# Patient Record
Sex: FEMALE | Race: WHITE | NOT HISPANIC OR LATINO | URBAN - METROPOLITAN AREA
[De-identification: names, ages, dates, MRNs, and addresses within clinical notes are randomized per-mention and may not be internally consistent; named-entity substitution may affect disease eponyms.]

---

## 2019-07-23 ENCOUNTER — OFFICE VISIT (OUTPATIENT)
Dept: RETAIL CLINIC | Facility: CLINIC | Age: 49
End: 2019-07-23

## 2019-07-23 VITALS
DIASTOLIC BLOOD PRESSURE: 82 MMHG | HEART RATE: 65 BPM | SYSTOLIC BLOOD PRESSURE: 124 MMHG | OXYGEN SATURATION: 99 % | TEMPERATURE: 98.4 F

## 2019-07-23 DIAGNOSIS — K04.7 TOOTH ABSCESS: Primary | ICD-10-CM

## 2019-07-23 DIAGNOSIS — K08.89 TOOTH PAIN: ICD-10-CM

## 2019-07-23 PROCEDURE — 99203 OFFICE O/P NEW LOW 30 MIN: CPT | Performed by: NURSE PRACTITIONER

## 2019-07-23 RX ORDER — AMOXICILLIN 875 MG/1
875 TABLET, COATED ORAL 2 TIMES DAILY
Qty: 20 TABLET | Refills: 0 | Status: SHIPPED | OUTPATIENT
Start: 2019-07-23 | End: 2019-08-02

## 2019-07-23 RX ORDER — CETIRIZINE HYDROCHLORIDE 10 MG/1
10 TABLET ORAL DAILY
COMMUNITY

## 2019-07-23 NOTE — PATIENT INSTRUCTIONS
Dental Abscess  A dental abscess is an area of pus in or around a tooth. It comes from an infection. It can cause pain and other symptoms. Treatment will help with symptoms and prevent the infection from spreading.  Follow these instructions at home:  Medicines  · Take over-the-counter and prescription medicines only as told by your dentist.  · If you were prescribed an antibiotic medicine, take it as told by your dentist. Do not stop taking it even if you start to feel better.  · If you were prescribed a gel that has numbing medicine in it, use it exactly as told.  · Do not drive or use heavy machinery (like a ) while taking prescription pain medicine.  General instructions  · Rinse out your mouth often with salt water.  ? To make salt water, dissolve ½-1 tsp of salt in 1 cup of warm water.  · Eat a soft diet while your mouth is healing.  · Drink enough fluid to keep your urine pale yellow.  · Do not apply heat to the outside of your mouth.  · Do not use any products that contain nicotine or tobacco. These include cigarettes and e-cigarettes. If you need help quitting, ask your doctor.  · Keep all follow-up visits as told by your dentist. This is important.  Prevent an abscess  · Brush your teeth every morning and every night. Use fluoride toothpaste.  · Floss your teeth each day.  · Get dental cleanings as often as told by your dentist.  · Think about getting dental sealant put on teeth that have deep holes (decay).  · Drink water that has fluoride in it.  ? Most tap water has fluoride.  ? Check the label on bottled water to see if it has fluoride in it.  · Drink water instead of sugary drinks.  · Eat healthy meals and snacks.  · Wear a mouth guard or face shield when you play sports.  Contact a doctor if:  · Your pain is worse, and medicine does not help.  Get help right away if:  · You have a fever or chills.  · Your symptoms suddenly get worse.  · You have a very bad headache.  · You have problems  breathing or swallowing.  · You have trouble opening your mouth.  · You have swelling in your neck or close to your eye.  Summary  · A dental abscess is an area of pus in or around a tooth. It is caused by an infection.  · Treatment will help with symptoms and prevent the infection from spreading.  · Take over-the-counter and prescription medicines only as told by your dentist.  · To prevent an abscess, take good care of your teeth. Brush your teeth every morning and night. Use floss every day.  · Get dental cleanings as often as told by your dentist.  This information is not intended to replace advice given to you by your health care provider. Make sure you discuss any questions you have with your health care provider.  Document Released: 05/03/2016 Document Revised: 08/20/2018 Document Reviewed: 08/20/2018  [employer] Interactive Patient Education © 2019 [employer]

## 2019-07-23 NOTE — PROGRESS NOTES
Subjective     Niru Reinoso is a 49 y.o.. female.     Dental Pain    This is a new problem. Episode onset: 3 days. The problem has been unchanged. The pain is mild. Pertinent negatives include no fever. She has tried NSAIDs (warm salt water/peroxide swish) for the symptoms. The treatment provided mild relief.       The following portions of the patient's history were reviewed and updated as appropriate: allergies, current medications, past medical history, past social history and past surgical history.    Review of Systems   Constitutional: Negative for fever.       Objective     Vitals:    07/23/19 1049   BP: 124/82   Pulse: 65   Temp: 98.4 °F (36.9 °C)   TempSrc: Oral   SpO2: 99%       Physical Exam   Constitutional: She is oriented to person, place, and time. She appears well-developed and well-nourished.   HENT:   Head: Normocephalic and atraumatic.   Mouth/Throat: Abnormal dentition (erytlhemic to gums around both teeth). Dental abscesses and dental caries (to both teeth) present.       Eyes: Pupils are equal, round, and reactive to light.   Cardiovascular: Normal rate and regular rhythm.   Pulmonary/Chest: Effort normal and breath sounds normal.   Musculoskeletal: Normal range of motion.   Neurological: She is alert and oriented to person, place, and time.   Vitals reviewed.      Assessment/Plan   Niru was seen today for dental pain.    Diagnoses and all orders for this visit:    Tooth abscess  -     amoxicillin (AMOXIL) 875 MG tablet; Take 1 tablet by mouth 2 (Two) Times a Day for 10 days.    Tooth pain        Patient Instructions   Dental Abscess  A dental abscess is an area of pus in or around a tooth. It comes from an infection. It can cause pain and other symptoms. Treatment will help with symptoms and prevent the infection from spreading.  Follow these instructions at home:  Medicines  · Take over-the-counter and prescription medicines only as told by your dentist.  · If you were prescribed an  antibiotic medicine, take it as told by your dentist. Do not stop taking it even if you start to feel better.  · If you were prescribed a gel that has numbing medicine in it, use it exactly as told.  · Do not drive or use heavy machinery (like a ) while taking prescription pain medicine.  General instructions  · Rinse out your mouth often with salt water.  ? To make salt water, dissolve ½-1 tsp of salt in 1 cup of warm water.  · Eat a soft diet while your mouth is healing.  · Drink enough fluid to keep your urine pale yellow.  · Do not apply heat to the outside of your mouth.  · Do not use any products that contain nicotine or tobacco. These include cigarettes and e-cigarettes. If you need help quitting, ask your doctor.  · Keep all follow-up visits as told by your dentist. This is important.  Prevent an abscess  · Brush your teeth every morning and every night. Use fluoride toothpaste.  · Floss your teeth each day.  · Get dental cleanings as often as told by your dentist.  · Think about getting dental sealant put on teeth that have deep holes (decay).  · Drink water that has fluoride in it.  ? Most tap water has fluoride.  ? Check the label on bottled water to see if it has fluoride in it.  · Drink water instead of sugary drinks.  · Eat healthy meals and snacks.  · Wear a mouth guard or face shield when you play sports.  Contact a doctor if:  · Your pain is worse, and medicine does not help.  Get help right away if:  · You have a fever or chills.  · Your symptoms suddenly get worse.  · You have a very bad headache.  · You have problems breathing or swallowing.  · You have trouble opening your mouth.  · You have swelling in your neck or close to your eye.  Summary  · A dental abscess is an area of pus in or around a tooth. It is caused by an infection.  · Treatment will help with symptoms and prevent the infection from spreading.  · Take over-the-counter and prescription medicines only as told by your  dentist.  · To prevent an abscess, take good care of your teeth. Brush your teeth every morning and night. Use floss every day.  · Get dental cleanings as often as told by your dentist.  This information is not intended to replace advice given to you by your health care provider. Make sure you discuss any questions you have with your health care provider.  Document Released: 05/03/2016 Document Revised: 08/20/2018 Document Reviewed: 08/20/2018  [employer] Interactive Patient Education © 2019 [employer]        Return if symptoms worsen or fail to improve with dentist; follow up with dentist  within one week.

## 2020-10-12 ENCOUNTER — OFFICE VISIT (OUTPATIENT)
Dept: OBSTETRICS AND GYNECOLOGY | Facility: CLINIC | Age: 50
End: 2020-10-12

## 2020-10-12 VITALS
BODY MASS INDEX: 16.55 KG/M2 | HEIGHT: 71 IN | WEIGHT: 118.2 LBS | DIASTOLIC BLOOD PRESSURE: 62 MMHG | SYSTOLIC BLOOD PRESSURE: 124 MMHG

## 2020-10-12 DIAGNOSIS — F17.210 CIGARETTE SMOKER: ICD-10-CM

## 2020-10-12 DIAGNOSIS — R87.612 LGSIL ON PAP SMEAR OF CERVIX: Primary | ICD-10-CM

## 2020-10-12 DIAGNOSIS — Z13.9 SCREENING FOR UNSPECIFIED CONDITION: ICD-10-CM

## 2020-10-12 LAB
B-HCG UR QL: NEGATIVE
INTERNAL NEGATIVE CONTROL: NEGATIVE
INTERNAL POSITIVE CONTROL: POSITIVE
Lab: NORMAL

## 2020-10-12 PROCEDURE — 57456 ENDOCERV CURETTAGE W/SCOPE: CPT | Performed by: OBSTETRICS & GYNECOLOGY

## 2020-10-12 PROCEDURE — 81025 URINE PREGNANCY TEST: CPT | Performed by: OBSTETRICS & GYNECOLOGY

## 2020-10-12 NOTE — PROGRESS NOTES
Colposcopy Procedure Note    Indications: Most recent Pap smear showed: low-grade squamous intraepithelial neoplasia (LGSIL - encompassing HPV,mild dysplasia,SOLEDAD I).  Prior cervical/vaginal disease: LGSIL/HPV.  Prior cervical treatment: no treatment.    Procedure Details   The risks and benefits of the procedure and Verbal informed consent obtained.    Speculum placed in vagina and excellent visualization of cervix achieved, cervix swabbed x 3 with acetic acid solution and Lugol's solution     Findings:  Physical Exam  Cervix: no visible lesions, no mosaicism, no punctation and no abnormal vasculature; cervix swabbed with Lugol's solution, SCJ visualized 360 degrees without lesions, endocervical curettage performed and specimen labelled and sent to pathology.  Vaginal inspection: vaginal colposcopy not performed.  Vulvar colposcopy: vulvar colposcopy not performed.    Specimens: ECC only    Complications: none.    PT TOLERATED PROCEDURE WELL.     IMPRESSION:  SMOKER, NEGATIVE, ADEQUATE COLPOSCOPY    Plan:  Call for ECC results.    Niru Reinoso  reports that she has been smoking. She has a 5.00 pack-year smoking history. She has quit using smokeless tobacco.. I have educated her on the risk of diseases from using tobacco products such as cancer, COPD and heart disease.     I advised her to quit and she is not willing to quit.    I spent 5 minutes counseling the patient.             Edgar Garcia MD  11:12 EDT  10/12/20

## 2020-10-14 LAB
DX ICD CODE: NORMAL
PATH REPORT.FINAL DX SPEC: NORMAL
PATH REPORT.GROSS SPEC: NORMAL
PATH REPORT.SITE OF ORIGIN SPEC: NORMAL
PATHOLOGIST NAME: NORMAL
PAYMENT PROCEDURE: NORMAL

## 2021-04-13 ENCOUNTER — OFFICE VISIT (OUTPATIENT)
Dept: OBSTETRICS AND GYNECOLOGY | Facility: CLINIC | Age: 51
End: 2021-04-13

## 2021-04-13 VITALS
WEIGHT: 114.4 LBS | HEIGHT: 71 IN | DIASTOLIC BLOOD PRESSURE: 70 MMHG | BODY MASS INDEX: 16.02 KG/M2 | SYSTOLIC BLOOD PRESSURE: 100 MMHG

## 2021-04-13 DIAGNOSIS — Z13.9 SCREENING FOR CONDITION: ICD-10-CM

## 2021-04-13 DIAGNOSIS — N39.0 URINARY TRACT INFECTION WITH HEMATURIA, SITE UNSPECIFIED: Primary | ICD-10-CM

## 2021-04-13 DIAGNOSIS — R87.612 LGSIL ON PAP SMEAR OF CERVIX: ICD-10-CM

## 2021-04-13 DIAGNOSIS — R31.9 URINARY TRACT INFECTION WITH HEMATURIA, SITE UNSPECIFIED: Primary | ICD-10-CM

## 2021-04-13 DIAGNOSIS — F17.210 CIGARETTE SMOKER: ICD-10-CM

## 2021-04-13 LAB
B-HCG UR QL: NEGATIVE
BILIRUB BLD-MCNC: NEGATIVE MG/DL
CLARITY, POC: CLEAR
COLOR UR: YELLOW
GLUCOSE UR STRIP-MCNC: NEGATIVE MG/DL
INTERNAL NEGATIVE CONTROL: NEGATIVE
INTERNAL POSITIVE CONTROL: POSITIVE
KETONES UR QL: NEGATIVE
LEUKOCYTE EST, POC: NEGATIVE
Lab: 55
NITRITE UR-MCNC: POSITIVE MG/ML
PH UR: 6 [PH] (ref 5–8)
PROT UR STRIP-MCNC: ABNORMAL MG/DL
RBC # UR STRIP: ABNORMAL /UL
SP GR UR: 1 (ref 1–1.03)
UROBILINOGEN UR QL: NORMAL

## 2021-04-13 PROCEDURE — 99213 OFFICE O/P EST LOW 20 MIN: CPT | Performed by: OBSTETRICS & GYNECOLOGY

## 2021-04-13 NOTE — PROGRESS NOTES
"EVALUATION AND MANAGEMENT ENCOUNTER    Niru Reinoso  Patient new to examiner? No  New problem to examiner? No  Patient referred? Yes from:  [facility].    -----------------------------------------------------HISTORY---------------------------------------------------    Chief Complaint:   Chief Complaint   Patient presents with   • Follow-up     Refered from [facility] for Abnormal PAP       HPI:  Niru Reinoso is a 51 y.o. No obstetric history on file. with No LMP recorded. (Menstrual status: Oral contraceptives). here for management of another pap that showed LGSIL.  I had recommended that this pt have a LEEP last fall, but pt did not come back for treatment.   Pt is a smoker and her mother has endometrial cancer apparently.     History of Present Illness     Niru Reinoso  reports that she has been smoking. She has a 5.00 pack-year smoking history. She has quit using smokeless tobacco.. I have educated her on the risk of diseases from using tobacco products such as cancer, COPD and heart disease.     I advised her to quit and she is not willing to quit.               ROS:  Review of Systems   Constitutional: Negative.    HENT: Negative.    Eyes: Negative.    Respiratory: Negative.    Cardiovascular: Negative.    Gastrointestinal: Negative.    Endocrine: Negative.    Musculoskeletal: Negative.    Skin: Negative.    Allergic/Immunologic: Negative.    Neurological: Negative.    Hematological: Negative.    Psychiatric/Behavioral: Negative.    :    Patient reports that she is not currently experiencing any symptoms of urinary incontinence.      noTESTED FOR CHLAMYDIA?  -----------------------------------------------PHYSICAL EXAM----------------------------------------------    Vital Signs: /70   Ht 179.1 cm (70.51\")   Wt 51.9 kg (114 lb 6.4 oz)   BMI 16.18 kg/m²    Flowsheet Rows      First Filed Value   Admission Height  179.1 cm (70.51\") Documented at 04/13/2021 1022   Admission Weight  51.9 kg (114 lb 6.4 oz) Documented " at 04/13/2021 1022          Physical Exam  Vitals and nursing note reviewed.   Constitutional:       Appearance: She is well-developed.   HENT:      Head: Normocephalic and atraumatic.   Cardiovascular:      Rate and Rhythm: Normal rate.   Pulmonary:      Effort: Pulmonary effort is normal.   Abdominal:      General: There is no distension.      Palpations: Abdomen is soft. There is no mass.      Tenderness: There is no abdominal tenderness. There is no guarding.   Genitourinary:     Vagina: No vaginal discharge.   Musculoskeletal:         General: No tenderness or deformity. Normal range of motion.      Cervical back: Normal range of motion.   Skin:     General: Skin is warm and dry.      Coloration: Skin is not pale.      Findings: No erythema or rash.   Neurological:      Mental Status: She is alert and oriented to person, place, and time.   Psychiatric:         Behavior: Behavior normal.         Thought Content: Thought content normal.         Judgment: Judgment normal.         -----------------------------------------------MEDICAL DECISION MAKING-----------------------------        DATA Review & labs ordered:     1.   Lab Results (last 24 hours)     Procedure Component Value Units Date/Time    POC Urinalysis Dipstick [[id number]]  (Abnormal) Collected: 04/13/21 1033    Specimen: Urine Updated: 04/13/21 1033     Color Yellow     Clarity, UA Clear     Glucose, UA Negative mg/dL      Bilirubin Negative     Ketones, UA Negative     Specific Gravity  1.005     Blood, UA Trace     pH, Urine 6.0     Protein, POC Trace mg/dL      Urobilinogen, UA Normal     Leukocytes Negative     Nitrite, UA Positive    POC Pregnancy, Urine [[id number]]  (Normal) Collected: 04/13/21 1033    Specimen: Urine Updated: 04/13/21 1033     HCG, Urine, QL Negative     Lot Number 55     Internal Positive Control Positive     Internal Negative Control Negative        2.   Imaging Results (Last 24 Hours)     ** No results found for the last 24  hours. **        3.   ECG/EMG Results (most recent)     None        4. Old records reviewed? Yes  5. Old records ordered?  Yes  6. Labs ordered?: Yes:  urinalysis: + nitrites.  Will culture.  7. Imaging other than ultrasound ordered?: No        Reviewed with other physician? no     8. Ultrasound ordered and reviewed? No  9. Diagnoses and/or chronic conditions reviewed:      Diagnoses and all orders for this visit:    1. Urinary tract infection with hematuria, site unspecified (Primary)  -     Urine Culture - Urine, Urine, Random Void    2. Screening for condition  -     POC Urinalysis Dipstick  -     POC Pregnancy, Urine    3. LGSIL on Pap smear of cervix: confirmed with ECC/    4. Cigarette smoker        IMPRESSION/PROBLEM:      Persistent LGSIL, smoker, UTI    (Established problem/s? Yes, worsening? No)    (New Problem/s? Yes, additional workup planned? Yes)      PLAN:     1. Culture urine  2.  Rec:  LEEP.  3. If LEEP without invasive disease, pt may be candidate for hysterectomy.    Pt to call for any results from testing promptly if she does not hear from us.     RTO Return if symptoms worsen or fail to improve. FOR LEEP.  Instructions and precautions given.         Edgar Garcia MD  10:46 EDT  04/13/21

## 2021-04-16 LAB
BACTERIA UR CULT: ABNORMAL
BACTERIA UR CULT: ABNORMAL
OTHER ANTIBIOTIC SUSC ISLT: ABNORMAL

## 2021-04-19 PROBLEM — N39.0 E. COLI UTI: Status: ACTIVE | Noted: 2021-04-19

## 2021-04-19 PROBLEM — B96.20 E. COLI UTI: Status: ACTIVE | Noted: 2021-04-19

## 2021-04-19 RX ORDER — NITROFURANTOIN 25; 75 MG/1; MG/1
100 CAPSULE ORAL 2 TIMES DAILY
Qty: 14 CAPSULE | Refills: 0 | Status: SHIPPED | OUTPATIENT
Start: 2021-04-19 | End: 2021-04-26

## 2021-04-26 ENCOUNTER — TELEPHONE (OUTPATIENT)
Dept: OBSTETRICS AND GYNECOLOGY | Facility: CLINIC | Age: 51
End: 2021-04-26

## 2021-04-26 NOTE — TELEPHONE ENCOUNTER
PLEASE FAX LABS TO [facility] AT [facility] [phone] WHEN THE [facility] COME BACK.

## 2021-08-03 ENCOUNTER — PROCEDURE VISIT (OUTPATIENT)
Dept: OBSTETRICS AND GYNECOLOGY | Facility: CLINIC | Age: 51
End: 2021-08-03

## 2021-08-03 VITALS
HEIGHT: 71 IN | DIASTOLIC BLOOD PRESSURE: 60 MMHG | BODY MASS INDEX: 15.16 KG/M2 | WEIGHT: 108.3 LBS | SYSTOLIC BLOOD PRESSURE: 100 MMHG

## 2021-08-03 DIAGNOSIS — N39.0 URINARY TRACT INFECTION WITHOUT HEMATURIA, SITE UNSPECIFIED: ICD-10-CM

## 2021-08-03 DIAGNOSIS — R87.612 LGSIL ON PAP SMEAR OF CERVIX: Primary | ICD-10-CM

## 2021-08-03 LAB
BILIRUB BLD-MCNC: NEGATIVE MG/DL
CLARITY, POC: CLEAR
COLOR UR: YELLOW
GLUCOSE UR STRIP-MCNC: NEGATIVE MG/DL
KETONES UR QL: NEGATIVE
LEUKOCYTE EST, POC: NEGATIVE
NITRITE UR-MCNC: POSITIVE MG/ML
PH UR: 5 [PH] (ref 5–8)
PROT UR STRIP-MCNC: NEGATIVE MG/DL
RBC # UR STRIP: NEGATIVE /UL
SP GR UR: 1 (ref 1–1.03)
UROBILINOGEN UR QL: NORMAL

## 2021-08-03 PROCEDURE — 57454 BX/CURETT OF CERVIX W/SCOPE: CPT | Performed by: OBSTETRICS & GYNECOLOGY

## 2021-08-03 PROCEDURE — 81002 URINALYSIS NONAUTO W/O SCOPE: CPT | Performed by: OBSTETRICS & GYNECOLOGY

## 2021-08-03 NOTE — PROGRESS NOTES
"Colposcopy Procedure Note    /60   Ht 179.1 cm (70.51\")   Wt 49.1 kg (108 lb 4.8 oz)   Breastfeeding No   BMI 15.31 kg/m²     Indications: Most recent Pap smear showed: low-grade squamous intraepithelial neoplasia (LGSIL - encompassing HPV,mild dysplasia,SOLEDAD I).  Prior cervical/vaginal disease: normal exam without visible pathology.  Prior cervical treatment: pt did not have LEEP done for this due to caretaker issues with her mom.  Pt was to have LEEP today but more biopsies were done to make sure the pathology wasn't worse.    Procedure Details   The risks and benefits of the procedure and Verbal informed consent obtained.    Speculum placed in vagina and excellent visualization of cervix achieved, cervix swabbed x 3 with acetic acid solution and Lugol's solution     Findings:  Physical Exam  Cervix: no visible lesions, no mosaicism, no punctation and no abnormal vasculature; cervix swabbed with Lugol's solution, SCJ visualized 360 degrees without lesions, endocervical curettage performed and cervical biopsies taken at 12   o'clock.  Vaginal inspection: normal without visible lesions.  Vulvar colposcopy: vulvar colposcopy not performed.    Specimens: cx bx, ECC done. No tampon. Minimal bleeding. No monsel's    Complications: none.    PT TOLERATED PROCEDURE WELL.     I saw the patient with a face mask, gloves and eye protection  The patient herself was masked.  Social distancing was observed as appropriate. All COVID precautions observed.     IMPRESSION:  NEGATIVE, ADEQUATE COLPOSCOPY    Plan:  rto for LEEP next week.       Edgar Garcia MD  13:41 EDT  08/03/21      "

## 2021-08-05 LAB
APPEARANCE UR: ABNORMAL
BACTERIA #/AREA URNS HPF: ABNORMAL /HPF
BACTERIA UR CULT: ABNORMAL
BACTERIA UR CULT: ABNORMAL
BILIRUB UR QL STRIP: NEGATIVE
COLOR UR: ABNORMAL
CRYSTALS URNS MICRO: ABNORMAL
DX ICD CODE: NORMAL
DX ICD CODE: NORMAL
EPI CELLS #/AREA URNS HPF: ABNORMAL /HPF
GLUCOSE UR QL: NEGATIVE
HGB UR QL STRIP: ABNORMAL
KETONES UR QL STRIP: ABNORMAL
LEUKOCYTE ESTERASE UR QL STRIP: ABNORMAL
NITRITE UR QL STRIP: NEGATIVE
OTHER ANTIBIOTIC SUSC ISLT: ABNORMAL
PATH REPORT.FINAL DX SPEC: NORMAL
PATH REPORT.GROSS SPEC: NORMAL
PATH REPORT.SITE OF ORIGIN SPEC: NORMAL
PATHOLOGIST NAME: NORMAL
PAYMENT PROCEDURE: NORMAL
PH UR STRIP: 6 [PH] (ref 5–8)
PROT UR QL STRIP: ABNORMAL
RBC #/AREA URNS HPF: ABNORMAL /HPF
SP GR UR: 1.02 (ref 1–1.03)
UROBILINOGEN UR STRIP-MCNC: ABNORMAL MG/DL
WBC #/AREA URNS HPF: ABNORMAL /HPF

## 2021-08-06 ENCOUNTER — TELEPHONE (OUTPATIENT)
Dept: OBSTETRICS AND GYNECOLOGY | Facility: CLINIC | Age: 51
End: 2021-08-06

## 2021-08-06 NOTE — TELEPHONE ENCOUNTER
Patient had positive nitrates at her visit on 8/3/21, could you please send her in an antibiotic. She is asking for something other than Macrobid.

## 2021-08-09 RX ORDER — SULFAMETHOXAZOLE AND TRIMETHOPRIM 800; 160 MG/1; MG/1
1 TABLET ORAL 2 TIMES DAILY
Qty: 14 TABLET | Refills: 0 | Status: SHIPPED | OUTPATIENT
Start: 2021-08-09 | End: 2021-08-16

## 2021-08-11 ENCOUNTER — OFFICE VISIT (OUTPATIENT)
Dept: OBSTETRICS AND GYNECOLOGY | Facility: CLINIC | Age: 51
End: 2021-08-11

## 2021-08-11 VITALS
BODY MASS INDEX: 15.46 KG/M2 | SYSTOLIC BLOOD PRESSURE: 100 MMHG | WEIGHT: 108 LBS | DIASTOLIC BLOOD PRESSURE: 60 MMHG | HEIGHT: 70 IN

## 2021-08-11 DIAGNOSIS — R87.612 LGSIL ON PAP SMEAR OF CERVIX: Primary | ICD-10-CM

## 2021-08-11 DIAGNOSIS — F17.210 CIGARETTE SMOKER: ICD-10-CM

## 2021-08-11 PROCEDURE — 57461 CONZ OF CERVIX W/SCOPE LEEP: CPT | Performed by: OBSTETRICS & GYNECOLOGY

## 2021-08-11 NOTE — PROGRESS NOTES
IN OFFICE OPERATIVE NOTE    PROCEDURE:  LOOP ENDOCERVICAL EXCISIONAL PROCEDURE with colposcopy    PREOP DX:  LGSIL     POSTOP DX:  same    SURGEON:  JOSE GUADALUPE    ANESTHESIA:  Local: 2% lidocaine w/ epi, 5cc    EBL:  minimalcc    SPECIMENS:  LEEP biopsy of cervix    COMPLICATIONS:  none    FINDINGS:  Normal cervix.           DESCRIPTION OF THE PROCEDURE:      Pt was in the dorsolithotomy position on the exam table.  After informed consent was obtained, she was draped.  No antibiotics were given.      Pt was prepped and draped in the usual fashion.  An open graves speculum was placed in the vagina and the anterior lip of the cervix was grasped with a single toothed tenaculum and the cervix was painted with Lugol's solution.  A colposcopy was performed using the green light filter and lugols solution and dilute acetic acid for stains.  After paracervical block with 2% lidocaine with epi was administered,   Using a loop bovie, a LEEP bx was obtained and sent for permanent section.  The leep bed was cauterized with a ball-tip bovie till hemostatic and Monsel's soln was painted on this till hemostatic.  The endocervix was sounded with a uterine sound and found to be completely patent after the procedure.     All instruments were removed and pt tolerated procedure well.  Tampon was placed with monsel's solution and pt instructed to remove in 4 hours.    Edgar Garcia MD

## 2021-08-13 LAB
DX ICD CODE: NORMAL
DX ICD CODE: NORMAL
PATH REPORT.FINAL DX SPEC: NORMAL
PATH REPORT.GROSS SPEC: NORMAL
PATH REPORT.SITE OF ORIGIN SPEC: NORMAL
PATHOLOGIST NAME: NORMAL
PAYMENT PROCEDURE: NORMAL

## 2021-09-07 ENCOUNTER — OFFICE VISIT (OUTPATIENT)
Dept: OBSTETRICS AND GYNECOLOGY | Facility: CLINIC | Age: 51
End: 2021-09-07

## 2021-09-07 VITALS — WEIGHT: 110.2 LBS | BODY MASS INDEX: 15.78 KG/M2 | HEIGHT: 70 IN

## 2021-09-07 DIAGNOSIS — N39.0 URINARY TRACT INFECTION WITHOUT HEMATURIA, SITE UNSPECIFIED: Primary | ICD-10-CM

## 2021-09-07 DIAGNOSIS — Z09 SURGICAL FOLLOWUP: ICD-10-CM

## 2021-09-07 PROCEDURE — 81002 URINALYSIS NONAUTO W/O SCOPE: CPT | Performed by: OBSTETRICS & GYNECOLOGY

## 2021-09-07 PROCEDURE — 99212 OFFICE O/P EST SF 10 MIN: CPT | Performed by: OBSTETRICS & GYNECOLOGY

## 2021-09-07 NOTE — PROGRESS NOTES
"POSTOP LEEP CHECK    S:  Doing ok.     O:  Ht 177.8 cm (70\")   Wt 50 kg (110 lb 3.2 oz)   Breastfeeding No   BMI 15.81 kg/m²     Exam deferred    A:  Path neg, margins clear.     P:  Quit smoking. Rpt pap 6 months.     Edgar Garcia MD  09:33 EDT  09/07/21      "

## 2021-09-09 LAB
BACTERIA UR CULT: ABNORMAL
BACTERIA UR CULT: ABNORMAL
OTHER ANTIBIOTIC SUSC ISLT: ABNORMAL

## 2021-09-10 RX ORDER — NITROFURANTOIN 25; 75 MG/1; MG/1
100 CAPSULE ORAL 2 TIMES DAILY
Qty: 14 CAPSULE | Refills: 0 | Status: SHIPPED | OUTPATIENT
Start: 2021-09-10 | End: 2021-09-17